# Patient Record
(demographics unavailable — no encounter records)

---

## 2024-11-07 NOTE — HISTORY OF PRESENT ILLNESS
[FreeTextEntry1] : PCP: [facility]  56M HLD, DM, smoker who presents for cardiac evaluation  smokes approx 0.25 ppd x 20 years  eats healthy sedentary feels fatigue, notes some FERNANDEZ  No fam hx of premature CAD or SCD

## 2024-11-07 NOTE — DISCUSSION/SUMMARY
[EKG obtained to assist in diagnosis and management of assessed problem(s)] : EKG obtained to assist in diagnosis and management of assessed problem(s) [FreeTextEntry1] : TTE to evaluate for abnl ECG and FERNANDEZ TST to evaluate for coronary ischemia heart healthy lifestyle reviewed smoking cessation